# Patient Record
Sex: FEMALE | Race: WHITE | NOT HISPANIC OR LATINO | Employment: OTHER | URBAN - METROPOLITAN AREA
[De-identification: names, ages, dates, MRNs, and addresses within clinical notes are randomized per-mention and may not be internally consistent; named-entity substitution may affect disease eponyms.]

---

## 2020-05-19 PROBLEM — M85.88 OTHER SPECIFIED DISORDERS OF BONE DENSITY AND STRUCTURE, OTHER SITE: Status: ACTIVE | Noted: 2020-05-19

## 2020-12-15 PROBLEM — M81.0 SENILE OSTEOPOROSIS: Status: ACTIVE | Noted: 2020-12-15

## 2021-08-13 PROBLEM — R31.0 GROSS HEMATURIA: Status: ACTIVE | Noted: 2021-08-13

## 2021-08-13 PROBLEM — N28.89 RENAL MASS, RIGHT: Status: ACTIVE | Noted: 2021-08-13

## 2021-08-26 PROBLEM — R04.2 HEMOPTYSIS: Status: ACTIVE | Noted: 2021-08-26

## 2022-02-23 ENCOUNTER — CLINICAL SUPPORT (OUTPATIENT)
Dept: REHABILITATION | Facility: HOSPITAL | Age: 67
End: 2022-02-23
Payer: MEDICARE

## 2022-02-23 DIAGNOSIS — M25.60 RANGE OF MOTION DEFICIT: ICD-10-CM

## 2022-02-23 DIAGNOSIS — M67.442 GANGLION OF LEFT HAND: Primary | ICD-10-CM

## 2022-02-23 DIAGNOSIS — M67.442 GANGLION OF LEFT HAND: ICD-10-CM

## 2022-02-23 PROCEDURE — 97165 OT EVAL LOW COMPLEX 30 MIN: CPT | Mod: PN

## 2022-02-23 PROCEDURE — 97110 THERAPEUTIC EXERCISES: CPT | Mod: PN

## 2022-02-23 NOTE — PATIENT INSTRUCTIONS
"[facility] - OCCUPATIONAL THERAPY  HOME EXERCISE PROGRAM     Moist heat x 10 minutes.    Complete the following massages once incision site is healed for 2 minutes each, 2 x/day.                       Complete the following exercises with 10 repetitions each, 4x/day.     AROM: Isolated MCP Flexion / Extension ("Wave")   Bend only your large, bottom knuckles. Hold 3 seconds. Keep the tips of your fingers straight. Straighten fingers.    AROM: Isolated IPJ Flexion / Extension ("Hook")  Bend only your middle and end knuckles. Hold 3 seconds.   Straighten your fingers.     AROM: MCP and PIP Flexion / Extension ("Straight Fist")  Bend your bottom and middle knuckles, keeping the tips of your fingers straight. Try to touch the pads of your fingers on your palm. Hold 3 seconds. Straighten your fingers.     AROM: Composite Flexion / Extension ("Full Fist")  Bend every joint in your hand into a fist. Hold 3 seconds. Straighten your fingers.     AROM: Composte Extension ("Finger Lifts")  Lift your finger off of the table one at a time. Hold 3 seconds. Relax your finger.    AROM: Abduction / Adduction  With hand flat on table, spread all fingers apart, then bring them together as close as possible.    Copyright © I. All rights reserved.      AROM: Wrist Flexion / Extension               Bend your wrist forward and back as far as possible.      AROM: Wrist Radial / Ulnar Deviation  Bend your wrist from side to side as far as possible.    AROM: Wrist Flexion / Extension  Make a fist, then bend your wrist forward then back as far as possible.         AROM: Wrist Radial / Ulnar Deviation   Make a fist then bend your wrist toward your body, then away.         Copyright © I. All rights reserved.     Therapist: JENNA Marx, HARI       "

## 2022-02-23 NOTE — PLAN OF CARE
"  [facility] Occupational Therapy  Initial Evaluation     Name: Prince Crowder  Clinic Number: [id number]    Therapy Diagnosis:   Encounter Diagnoses   Name Primary?    Ganglion of left hand     Range of motion deficit      Physician: Soy Edmond    Physician Orders: Eval and treat A/AA/PROM  Medical Diagnosis: Left hand ganglion cyst  Surgical Procedure and Date: 2/17/2022   Evaluation Date: 2/23/2022  Insurance: Medicare  Insurance Authorization period Expiration: 03/08/2022     Plan of Care Certification Period: 03/08/2022    Visit # / Visits Authortized: 1 / 1  Time In:4:00 pm  Time Out: 4:50 pm  Total Billable Time: 10 minutes    Precautions: Standard    Subjective     Involved Side: left  Dominant Side: Right  Date of Onset: 11/2021 to 12/2-21  Mechanism of Injury: Pt reports that she thinks the cyst came from receiving an IV  History of Current Condition: Pt states that she began having dorsal shooting pains from the cyst.  Imaging: none available  Previous Therapy: yes    Patient's Goals for Therapy: "regain full use of left hand"    Pain:  Functional Pain Scale Rating 0-10:   5/10 on average  4/10 at best  8/10 at worst  Location: left wrist  Description: Aching and Dull  Aggravating Factors: Flexing  Easing Factors: rest    Occupation:  retired  Working presently: home-maker  Duties: household duties    Functional Limitations/Social History:    Previous functional status includes: Independent with all ADLs.     Current FunctionalStatus   Home/Living environment : lives with their spouse      Limitation of Functional Status as follows:   ADLs/IADLs:     - Feeding:Independent    - Bathing: Modified independent    - Dressing/Grooming: Difficulty with buttoning    - Driving: Independent     Leisure: Crafts - [place] - Modified independent      Past Medical History/Physical Systems Review:   Prince Crowder  has a past medical history of Allergic rhinitis, seasonal, Arthritis, JASSI " (generalized anxiety disorder), GERD (gastroesophageal reflux disease), Hiatal hernia, History of removal of cyst, Hypoglycemia, IBS (irritable bowel syndrome), Migraine, Mitral valve prolapse, Osteoporosis, and Postsurgical dumping syndrome.    Prince Crowder  has a past surgical history that includes Cyst Removal; Hysterectomy; Appendectomy;  section; arm surgery; Dilation and curettage of uterus (); Cholecystectomy; Bronchoscopy (N/A, 2021); and Colonoscopy (2020).    Prince has a current medication list which includes the following prescription(s): amitriptyline, aspirin, calcium carbonate-vit d3-min, denosumab, dicyclomine, domperidone, fluoxetine, ibuprofen, meloxicam, mesalamine, pantoprazole, premarin, promethazine, and sucralfate.    Review of patient's allergies indicates:   Allergen Reactions    Cinobac [cinoxacin] Hives    Morphine Hives    Iodinated contrast media           Objective   Observation/Appearance:   Sutures intact and no signs or symptoms of infection noted.    Edema. Measured in centimeters.   2022    Left Right   Proximal Wrist Crease 15.6 14.8   Mid palm 17.2 17.8   MCPs 17 18       Wrist AROM  Date 2022    Left Right   Supination/Pronation     Wrist ext/flex 55/20 65/65   Wrist RD/UD 20/35 20/35       Hand ROM. WNL    Sensation:  Intact       Strength (Dyanmometer) and Pinch Strength (Pinch Gauge)  Measured in pounds and psi. Average of three trials.   2022    Left Right   Rung II def def           CMS Impairment/Limitation/Restriction for FOTO Hand Survey    Therapist reviewed FOTO scores for Prince Crowder on 2022.   FOTO documents entered into [employer] - see Media section.    Limitation Score: 52%  Category: Carrying    Current : CK = at least 40% but < 60% impaired, limited or restricted  Goal: CJ = at least 20% but < 40% impaired, limited or restricted           Treatment     Treatment Time In: 4:00 pm  Treatment Time  "Out: 4:50 pm  Total Treatment time separate from Evaluation time:10 minutes    Prince received the following supervised modalities after being cleared for contradictions for 10 minutes:   -Patient received MH x 10 min to left wrist to increase blood flow, circulation and tissue elasticity prior to therex      Prince received the following direct contact modalities after being cleared for contraindications for 0 minutes:  -NT    Prince received the following manual therapy techniques for 0 minutes:   -NT    Prince received therapeutic exercises for 10 minutes including:  -AROM as follows: TGEs, finger abd/add, finger ext, wrist ext/flex (open/closed hand), RD/UD (open/closed hand) x 10 reps each    Home Exercise Program/Education:  Issued HEP (see patient instructions in EMR) and educated on modality use for pain management . Exercises were reviewed and Prinec was able to demonstrate them prior to the end of the session.   Pt received a written copy of exercises to perform at home. Prince demonstrated good  understanding of the education provided.  Pt was advised to perform these exercises free of pain, and to stop performing them if pain occurs.    Patient/Family Education: role of OT, goals for OT, scheduling/cancellations - pt verbalized understanding. Discussed insurance limitations with patient.    Additional Education provided: Issued Tubigrip C for edema management  -cleaned incision site with soap and water with 1" guaze over incision and Tubigrip    Assessment     Prince Crowder is a 66 y.o. female referred to outpatient occupational/hand therapy and presents with a medical diagnosis of left hand ganglion cyst, resulting in edema, decreased range of motion, decreased strength and demonstrates limitations as described in the chart below. Following a brief medical record review it is determined that pt will benefit from occupational therapy services in order to maximize pain free and/or functional use of left hand/wrist.     The " "patient's rehab potential is Excellent.     Anticipated barriers to occupational therapy: none  Pt has no cultural, educational or language barriers to learning provided.    Profile and History Assessment of Occupational Performance Level of Clinical Decision Making Complexity Score   Occupational Profile:   Prince Crowder is a 66 y.o. female who lives with their spouse and is currently home-maker. Prince Crowder has difficulty with  bathing and dressing  housework/household chores  affecting his/her daily functional abilities. His/her main goal for therapy is "regain full use of left hand".     Comorbidities:    has a past medical history of Allergic rhinitis, seasonal, Arthritis, JASSI (generalized anxiety disorder), GERD (gastroesophageal reflux disease), Hiatal hernia, History of removal of cyst, Hypoglycemia, IBS (irritable bowel syndrome), Migraine, Mitral valve prolapse, Osteoporosis, and Postsurgical dumping syndrome.        Medical and Therapy History Review:   Brief               Performance Deficits    Physical:  Joint Mobility  Muscle Power/Strength  Muscle Endurance  Skin Integrity/Scar Formation  Edema   Strength  Pain    Cognitive:  No Deficits    Psychosocial:    No Deficits     Clinical Decision Making:  low    Assessment Process:  Problem-Focused Assessments    Modification/Need for Assistance:  Not Necessary    Intervention Selection:  Limited Treatment Options       low  Based on PMHX, co morbidities , data from assessments and functional level of assistance required with task and clinical presentation directly impacting function.       The following goals were discussed with the patient and patient is in agreement with them as to be addressed in the treatment plan.     Goals:   Short Term (4 weeks on 3/23/2022):  1)   Patient to be IND with HEP and modalities for pain management.  2)   Increase ROM 10 to 15 degrees for wrist flexion  to increase functional hand use for buttoning.  3)   Measure  " strength at 4 weeks post op.  4)   Decrease edema .2-.3 cm to increase joint mobility /flexibility for improved overall functional hand use.   5)   Decrease complaints of pain to  2 out of 10 to increase functional hand use for ADL/work/leisure activities.    Long Term (by discharge):  1)   Pt will report 0 out of 10 pain with left hand use.  2)   Patient to score at  on FOTO to demonstrate improved perception of functional left hand use.  3)   Pt will return to prior level of function for ADLs and household management.       Plan   Certification Period/Plan of care expiration: 2/23/2022 to 4/7/2022.    Outpatient Occupational Therapy 2 times weekly for 6 weeks may include the following interventions: Paraffin, Fluidotherapy, Manual therapy/joint mobilizations, Modalities for pain management, US 3 mhz, Therapeutic exercises/activities., Strengthening, Edema Control, Scar Management and Wound Care.      Kamilla Lackey, OT    I certify the need for these services furnished under the plan of treatment and while under my care.     ____________________________________________________________________  Physician/Referring Practitioner   Date of Signature

## 2022-02-28 ENCOUNTER — CLINICAL SUPPORT (OUTPATIENT)
Dept: REHABILITATION | Facility: HOSPITAL | Age: 67
End: 2022-02-28
Payer: MEDICARE

## 2022-02-28 DIAGNOSIS — M25.60 RANGE OF MOTION DEFICIT: Primary | ICD-10-CM

## 2022-02-28 PROCEDURE — 97110 THERAPEUTIC EXERCISES: CPT | Mod: PN

## 2022-02-28 NOTE — PROGRESS NOTES
Occupational Therapy Daily Treatment Note     Date: 2/28/2022  Name: Prince Crowder  Clinic Number: [id number]    Therapy Diagnosis:   Encounter Diagnosis   Name Primary?    Range of motion deficit Yes     Physician: Soy Edmond    Physician Orders: Eval and treat A/AA/PROM  Medical Diagnosis: Left hand ganglion cyst  Surgical Procedure and Date: 2/17/2022   Evaluation Date: 2/23/2022  Insurance: Medicare  Insurance Authorization period Expiration: 03/08/2022      Plan of Care Certification Period: 03/08/2022     Visit # / Visits Authortized: 2/20  Time In:1:30 pm  Time Out: 2:05 pm  Total Billable Time: 25 minutes     Precautions: Standard    Subjective     Pt reports: she was compliant with home exercise program given last session.   Response to previous treatment:more motion and soreness  Functional change: none noted    Pain: 6/10  Location: left wrist     Objective   Edema. Measured in centimeters.    2/23/2022 2/23/2022     Left Right   Proximal Wrist Crease 15.6 14.8   Mid palm 17.2 17.8   MCPs 17 18         Wrist AROM  Date 2/23/2022 2/23/2022     Left Right   Supination/Pronation       Wrist ext/flex 55/20 65/65   Wrist RD/UD 20/35 20/35          Strength (Dyanmometer) and Pinch Strength (Pinch Gauge)  Measured in pounds and psi. Average of three trials.    2/23/2022 2/23/2022     Left Right   Rung II def def          Prince received the following supervised modalities after being cleared for contradictions for 10 minutes:   -Patient received MH x 10 min to left hand/wrist to increase blood flow, circulation and tissue elasticity prior to therex      Prince received the following manual therapy techniques for 0 minutes:   -NT    Prince received therapeutic exercises for 25 minutes including:  -AROM as follows: TGEs, finger abd/add, finger ext, wrist ext/flex (open/closed hand), RD/UD (open/closed hand) x 10 reps each   -blue wrist roller for wrist flex/ext x 3'  -dowel for  RD/UD x 3'     Pt washed incision site. Guaze and Tubigrip C re-applied.  Home Exercises and Education Provided     Education provided:   - cont HEP  - Progress towards goals: Excellent     Written Home Exercises Provided: Patient instructed to cont prior HEP.  Exercises were reviewed and Prince was able to demonstrate them prior to the end of the session.  Prince demonstrated good  understanding of the education provided.     See EMR under Patient Instructions for exercises provided prior visit.     Prince demonstrated good  understanding of the education provided.         Assessment      Prince Crowder is a 66 y.o. female referred to outpatient occupational/hand therapy and presents with a medical diagnosis of left hand ganglion cyst, resulting in edema, decreased range of motion and decreased strength of left hand.    Pt would continue to benefit from skilled OT.      Prince is progressing well towards her goals and there are no updates to goals at this time. Pt prognosis is Excellent.     Pt will continue to benefit from skilled outpatient occupational therapy to address the deficits listed in the problem list on initial evaluation provide pt/family education and to maximize pt's level of independence in the home and community environment.     Anticipated barriers to occupational therapy: none    Pt's spiritual, cultural and educational needs considered and pt agreeable to plan of care and goals.    Goals:  Short Term (4 weeks on 3/23/2022):  1)   Patient to be IND with HEP and modalities for pain management.-Met 2/28/2022  2)   Increase ROM 10 to 15 degrees for wrist flexion  to increase functional hand use for buttoning.-progressing  3)   Measure  strength at 4 weeks post op.-progressing  4)   Decrease edema .2-.3 cm to increase joint mobility /flexibility for improved overall functional hand use. -progressing  5)   Decrease complaints of pain to  2 out of 10 to increase functional hand use for ADL/work/leisure  activities.-progressing     Long Term (by discharge):  1)   Pt will report 0 out of 10 pain with left hand use-progressing  2)   Patient to score at  on FOTO to demonstrate improved perception of functional left hand use.-progressing  3)   Pt will return to prior level of function for ADLs and household management. -progressing        Plan   Certification Period/Plan of care expiration: 2/23/2022 to 4/7/2022.     Outpatient Occupational Therapy 2 times weekly for 6 weeks may include the following interventions: Paraffin, Fluidotherapy, Manual therapy/joint mobilizations, Modalities for pain management, US 3 mhz, Therapeutic exercises/activities., Strengthening, Edema Control, Scar Management and Wound Care.       Discussed Plan of Care with patient: Yes  Updates/Grading for next session: Advance as tolerated.      Kamilla Lackey, OT

## 2022-03-04 ENCOUNTER — CLINICAL SUPPORT (OUTPATIENT)
Dept: REHABILITATION | Facility: HOSPITAL | Age: 67
End: 2022-03-04
Payer: MEDICARE

## 2022-03-04 DIAGNOSIS — M25.60 RANGE OF MOTION DEFICIT: Primary | ICD-10-CM

## 2022-03-04 PROCEDURE — 97110 THERAPEUTIC EXERCISES: CPT | Mod: PN

## 2022-03-04 PROCEDURE — 97022 WHIRLPOOL THERAPY: CPT | Mod: PN

## 2022-03-04 NOTE — PROGRESS NOTES
Occupational Therapy Daily Treatment Note     Date: 3/4/2022  Name: Prince Crowder  Clinic Number: [id number]    Therapy Diagnosis:   Encounter Diagnosis   Name Primary?    Range of motion deficit Yes     Physician: Soy Edmond    Physician Orders: Eval and treat A/AA/PROM  Medical Diagnosis: Left hand ganglion cyst  Surgical Procedure and Date: 2/17/2022   Evaluation Date: 2/23/2022  Insurance: Medicare  Insurance Authorization period Expiration: 03/08/2022      Plan of Care Certification Period: 03/08/2022     Visit # / Visits Authortized: 3/20  Time In: 12:00  pm  Time Out: 12:35  pm  Total Billable Time: 25 minutes     Precautions: Standard    Subjective     Pt reports: she was compliant with home exercise program given last session.   Response to previous treatment:more motion and soreness  Functional change: none noted    Pain: 4/10  Location: left wrist     Objective   Observations:  Sutures removed. Scar healing well.    Edema. Measured in centimeters.    2/23/2022 2/23/2022     Left Right   Proximal Wrist Crease 15.6 14.8   Mid palm 17.2 17.8   MCPs 17 18         Wrist AROM  Date 2/23/2022 2/23/2022     Left Right   Supination/Pronation       Wrist ext/flex 55/20 65/65   Wrist RD/UD 20/35 20/35          Strength (Dyanmometer) and Pinch Strength (Pinch Gauge)  Measured in pounds and psi. Average of three trials.    2/23/2022 2/23/2022     Left Right   Rung II def def          Prince received the following supervised modalities after being cleared for contradictions for 10 minutes:   -Patient received fluidotherapy to left hand for 10 minutes to increase blood flow, circulation, desensitization, sensory re-education and for pain management.     Prince received the following manual therapy techniques for 0 minutes:   -NT    Prince received therapeutic exercises for 25 minutes including:  -AROM as follows: TGEs, finger abd/add, finger ext, wrist ext/flex (open/closed hand), RD/UD  (open/closed hand) x 10 reps each   -blue wrist roller for wrist flex/ext x 3'  -dowel for RD/UD x 3'   -hand gripper 1 yellow band x 2'    Tubigrip C re-applied.  Home Exercises and Education Provided     Education provided:   - cont HEP  - Progress towards goals: Excellent     Written Home Exercises Provided: Patient instructed to cont prior HEP.  Exercises were reviewed and Prince was able to demonstrate them prior to the end of the session.  Prince demonstrated good  understanding of the education provided.     See EMR under Patient Instructions for exercises provided prior visit.     Prince demonstrated good  understanding of the education provided.         Assessment      Prince Crowder is a 66 y.o. female referred to outpatient occupational/hand therapy and presents with a medical diagnosis of left hand ganglion cyst, resulting in edema, decreased range of motion and decreased strength of left hand. Pt's sutures were removed and her scar is healing well. Pt tolerated all activities without difficulty. Pt is motivated and participates well.     Pt would continue to benefit from skilled OT.      Prince is progressing well towards her goals and there are no updates to goals at this time. Pt prognosis is Excellent.     Pt will continue to benefit from skilled outpatient occupational therapy to address the deficits listed in the problem list on initial evaluation provide pt/family education and to maximize pt's level of independence in the home and community environment.     Anticipated barriers to occupational therapy: none    Pt's spiritual, cultural and educational needs considered and pt agreeable to plan of care and goals.    Goals:  Short Term (4 weeks on 3/23/2022):  1)   Patient to be IND with HEP and modalities for pain management.-Met 2/28/2022  2)   Increase ROM 10 to 15 degrees for wrist flexion  to increase functional hand use for buttoning.-progressing  3)   Measure  strength at 4 weeks post op.-progressing  4)    Decrease edema .2-.3 cm to increase joint mobility /flexibility for improved overall functional hand use. -progressing  5)   Decrease complaints of pain to  2 out of 10 to increase functional hand use for ADL/work/leisure activities.-progressing     Long Term (by discharge):  1)   Pt will report 0 out of 10 pain with left hand use-progressing  2)   Patient to score at  on FOTO to demonstrate improved perception of functional left hand use.-progressing  3)   Pt will return to prior level of function for ADLs and household management. -progressing        Plan: Measure next session.   Certification Period/Plan of care expiration: 2/23/2022 to 4/7/2022.     Outpatient Occupational Therapy 2 times weekly for 6 weeks may include the following interventions: Paraffin, Fluidotherapy, Manual therapy/joint mobilizations, Modalities for pain management, US 3 mhz, Therapeutic exercises/activities., Strengthening, Edema Control, Scar Management and Wound Care.       Discussed Plan of Care with patient: Yes  Updates/Grading for next session: Advance as tolerated.      Kamilla Lackey, OT

## 2022-03-07 ENCOUNTER — CLINICAL SUPPORT (OUTPATIENT)
Dept: REHABILITATION | Facility: HOSPITAL | Age: 67
End: 2022-03-07
Payer: MEDICARE

## 2022-03-07 DIAGNOSIS — M25.60 RANGE OF MOTION DEFICIT: Primary | ICD-10-CM

## 2022-03-07 PROCEDURE — 97022 WHIRLPOOL THERAPY: CPT | Mod: PN

## 2022-03-07 PROCEDURE — 97110 THERAPEUTIC EXERCISES: CPT | Mod: PN

## 2022-03-07 NOTE — PROGRESS NOTES
Occupational Therapy Daily Treatment Note     Date: 3/7/2022  Name: Prince Crowder  Clinic Number: [id number]    Therapy Diagnosis:   Encounter Diagnosis   Name Primary?    Range of motion deficit Yes     Physician: Soy Edmond    Physician Orders: Eval and treat A/AA/PROM  Medical Diagnosis: Left hand ganglion cyst  Surgical Procedure and Date: 2/17/2022   Evaluation Date: 2/23/2022  Insurance: Medicare  Insurance Authorization period Expiration: 03/08/2022      Plan of Care Certification Period: 03/08/2022     Visit # / Visits Authortized: 4/20  Time In: 2:15  pm  Time Out: 3:00 pm  Total Billable Time: 35 minutes     Precautions: Standard    Subjective     Pt reports: she was compliant with home exercise program given last session.   Response to previous treatment:more motion and soreness  Functional change: Pt can  light objects without pain.    Pain: 2/10 at best 6/10 at worst  Location: left wrist     Objective   Observations:  Scar healing well.    Edema. Measured in centimeters.    2/23/2022 2/23/2022 3/7/2022     Left Right Left   Proximal Wrist Crease 15.6 14.8 15   Mid palm 17.2 17.8 16   MCPs 17 18 17         Wrist AROM  Date 2/23/2022 2/23/2022 3/7/2022     Left Right Left   Supination/Pronation  WNL WNL  WNL   Wrist ext/flex 55/20 65/65 65/50   Wrist RD/UD 20/35 20/35 20/35          Strength (Dyanmometer) and Pinch Strength (Pinch Gauge)  Measured in pounds and psi. Average of three trials.    2/23/2022 2/23/2022     Left Right   Rung II def def          Prince received the following supervised modalities after being cleared for contradictions for 10 minutes:   -Patient received fluidotherapy to left hand for 10 minutes to increase blood flow, circulation, desensitization, sensory re-education and for pain management.     Prince received the following manual therapy techniques for 2 minutes:   -scar massage    Prince received therapeutic exercises for 33 minutes  including:  -AROM as follows: TGEs, finger abd/add, finger ext, wrist ext/flex (open/closed hand), RD/UD (open/closed hand) x 10 reps each   -blue wrist roller for wrist flex/ext x 3'  -wrist 3 ways with 1 lb x 2' each  -hand gripper 1 red band x 2'  -digi extend with 1 yellow band x 2'    Home Exercises and Education Provided     Education provided:   - cont HEP  - Progress towards goals: Excellent     Written Home Exercises Provided: Patient instructed to cont prior HEP.  Exercises were reviewed and Prince was able to demonstrate them prior to the end of the session.  Prince demonstrated good  understanding of the education provided.     See EMR under Patient Instructions for exercises provided prior visit.     Prince demonstrated good  understanding of the education provided.         Assessment      Prince Crowder is a 66 y.o. female referred to outpatient occupational/hand therapy and presents with a medical diagnosis of left hand ganglion cyst, resulting in edema, decreased range of motion and decreased strength of left hand. Pt's edema has decreased in her left hand. She has made moderate improvements with her AROM in her left wrist. Advanced activities and she tolerated all activities mild c/o pain. Pt is motivated and participates well.     Pt would continue to benefit from skilled OT.      Prince is progressing well towards her goals and there are no updates to goals at this time. Pt prognosis is Excellent.     Pt will continue to benefit from skilled outpatient occupational therapy to address the deficits listed in the problem list on initial evaluation provide pt/family education and to maximize pt's level of independence in the home and community environment.     Anticipated barriers to occupational therapy: none    Pt's spiritual, cultural and educational needs considered and pt agreeable to plan of care and goals.    Goals:  Short Term (4 weeks on 3/23/2022):  1)   Patient to be IND with HEP and modalities for pain  management.-Met 2/28/2022  2)   Increase ROM 10 to 15 degrees for wrist flexion  to increase functional hand use for buttoning.- met 3/7/2022  3)   Measure  strength at 4 weeks post op.-progressing  4)   Decrease edema .2-.3 cm to increase joint mobility /flexibility for improved overall functional hand use. - met 3/7/2022  5)   Decrease complaints of pain to  2 out of 10 to increase functional hand use for ADL/work/leisure activities.-progressing     Long Term (by discharge):  1)   Pt will report 0 out of 10 pain with left hand use-progressing  2)   Patient to score at  on FOTO to demonstrate improved perception of functional left hand use.-progressing  3)   Pt will return to prior level of function for ADLs and household management. -progressing        Plan:    Certification Period/Plan of care expiration: 2/23/2022 to 4/7/2022.     Outpatient Occupational Therapy 2 times weekly for 6 weeks may include the following interventions: Paraffin, Fluidotherapy, Manual therapy/joint mobilizations, Modalities for pain management, US 3 mhz, Therapeutic exercises/activities., Strengthening, Edema Control, Scar Management and Wound Care.       Discussed Plan of Care with patient: Yes  Updates/Grading for next session: Advance as tolerated.      Kamilla Lackey, OT

## 2022-03-21 ENCOUNTER — CLINICAL SUPPORT (OUTPATIENT)
Dept: REHABILITATION | Facility: HOSPITAL | Age: 67
End: 2022-03-21
Payer: MEDICARE

## 2022-03-21 DIAGNOSIS — M25.60 RANGE OF MOTION DEFICIT: Primary | ICD-10-CM

## 2022-03-21 PROCEDURE — 97110 THERAPEUTIC EXERCISES: CPT | Mod: PN

## 2022-03-21 PROCEDURE — 97022 WHIRLPOOL THERAPY: CPT | Mod: PN

## 2022-03-21 NOTE — PROGRESS NOTES
Occupational Therapy Daily Treatment Note     Date: 3/21/2022  Name: Prince Crowder  Clinic Number: [id number]    Therapy Diagnosis:   Encounter Diagnosis   Name Primary?    Range of motion deficit Yes     Physician: Soy Edmond    Physician Orders: Eval and treat A/AA/PROM  Medical Diagnosis: Left hand ganglion cyst  Surgical Procedure and Date: 2/17/2022   Evaluation Date: 2/23/2022  Insurance: Medicare  Insurance Authorization period Expiration: 03/08/2022      Plan of Care Certification Period: 03/08/2022     Visit # / Visits Authortized: 5/20  Time In: 3:05  pm  Time Out:3:55 pm  Total Billable Time: 40 minutes     Precautions: Standard    Subjective     Pt reports: she was not compliant with home exercise program given last session due to illness. She states that she had to cancel last week because she was sick.  Response to previous treatment:more motion and soreness  Functional change: less pain with left hand use    Pain: 1/10 at best 5/10 at worst  Location: left wrist     Objective   Observations:  Scar healing well.    Edema. Measured in centimeters.    2/23/2022 2/23/2022 3/7/2022     Left Right Left   Proximal Wrist Crease 15.6 14.8 15   Mid palm 17.2 17.8 16   MCPs 17 18 17         Wrist AROM  Date 2/23/2022 2/23/2022 3/7/2022     Left Right Left   Supination/Pronation  WNL WNL  WNL   Wrist ext/flex 55/20 65/65 65/50   Wrist RD/UD 20/35 20/35 20/35          Strength (Dyanmometer) and Pinch Strength (Pinch Gauge)  Measured in pounds and psi. Average of three trials.    2/23/2022 2/23/2022     Left Right   Rung II def def         CMS Impairment/Limitation/Restriction for FOTO Hand Survey    Therapist reviewed FOTO scores for Prince Crowder on 3/21/2022.   FOTO documents entered into [employer] - see Media section.    Limitation Score: 40%  Category: Carrying    Current : CK = at least 40% but < 60% impaired, limited or restricted  Goal: CJ = at least 20% but < 40% impaired,  limited or restricted              Prince received the following supervised modalities after being cleared for contradictions for 10 minutes:   -Patient received fluidotherapy to left hand for 10 minutes to increase blood flow, circulation, desensitization, sensory re-education and for pain management.     Prince received the following manual therapy techniques for 2 minutes:   -scar massage    Prince received therapeutic exercises for 38 minutes including:  -AROM as follows: TGEs, finger abd/add, finger ext, wrist ext/flex (open/closed hand), RD/UD (open/closed hand) x 10 reps each   -blue wrist roller for wrist flex/ext x 3'  -dextercisor x 3'  -wrist 3 ways with 1 lb x 2' each  -hand gripper 1 red band x 2'  -digi extend with 1 yellow band x 2'    Home Exercises and Education Provided     Education provided:   - cont HEP, Issued silicone tape for scar management  - Progress towards goals: Excellent     Written Home Exercises Provided: Patient instructed to cont prior HEP.  Exercises were reviewed and Prince was able to demonstrate them prior to the end of the session.  Prince demonstrated good  understanding of the education provided.     See EMR under Patient Instructions for exercises provided prior visit.     Prince demonstrated good  understanding of the education provided.         Assessment      Prince Crowder is a 66 y.o. female referred to outpatient occupational/hand therapy and presents with a medical diagnosis of left hand ganglion cyst, resulting in edema, decreased range of motion and decreased strength of left hand. Pt reports decreased pain with left hand use. She states that she wasn't as compliant with her HEP due to illness.  Advanced activities without c/o pain. Pt is motivated and participates well.     Pt would continue to benefit from skilled OT.      Prince is progressing well towards her goals and there are no updates to goals at this time. Pt prognosis is Excellent.     Pt will continue to benefit from skilled  outpatient occupational therapy to address the deficits listed in the problem list on initial evaluation provide pt/family education and to maximize pt's level of independence in the home and community environment.     Anticipated barriers to occupational therapy: none    Pt's spiritual, cultural and educational needs considered and pt agreeable to plan of care and goals.    Goals:  Short Term (4 weeks on 3/23/2022):  1)   Patient to be IND with HEP and modalities for pain management.-Met 2/28/2022  2)   Increase ROM 10 to 15 degrees for wrist flexion  to increase functional hand use for buttoning.- met 3/7/2022  3)   Measure  strength at 4 weeks post op.-progressing  4)   Decrease edema .2-.3 cm to increase joint mobility /flexibility for improved overall functional hand use. - met 3/7/2022  5)   Decrease complaints of pain to  2 out of 10 to increase functional hand use for ADL/work/leisure activities.-progressing     Long Term (by discharge):  1)   Pt will report 0 out of 10 pain with left hand use-progressing  2)   Patient to score at  on FOTO to demonstrate improved perception of functional left hand use.-progressing  3)   Pt will return to prior level of function for ADLs and household management. -progressing        Plan: Measure next session.   Certification Period/Plan of care expiration: 2/23/2022 to 4/7/2022.     Outpatient Occupational Therapy 2 times weekly for 6 weeks may include the following interventions: Paraffin, Fluidotherapy, Manual therapy/joint mobilizations, Modalities for pain management, US 3 mhz, Therapeutic exercises/activities., Strengthening, Edema Control, Scar Management and Wound Care.       Discussed Plan of Care with patient: Yes  Updates/Grading for next session: Advance as tolerated.      Kamilla Lackey, OT

## 2022-03-24 ENCOUNTER — CLINICAL SUPPORT (OUTPATIENT)
Dept: REHABILITATION | Facility: HOSPITAL | Age: 67
End: 2022-03-24
Payer: MEDICARE

## 2022-03-24 DIAGNOSIS — M25.60 RANGE OF MOTION DEFICIT: Primary | ICD-10-CM

## 2022-03-24 PROCEDURE — 97110 THERAPEUTIC EXERCISES: CPT | Mod: PN

## 2022-03-24 PROCEDURE — 97022 WHIRLPOOL THERAPY: CPT | Mod: PN

## 2022-03-24 NOTE — PATIENT INSTRUCTIONS
[facility]  OCCUPATIONAL THERAPY  HOME EXERCISE PROGRAM     Complete the following strengthening program 1x/day.                       _          JENNA Marx, HARI  Certified Hand Therapist  Occupational Therapist                                          Arthritis Conservative Management  Joint Protection:  Use larger joints and muscles when possible  Stop activities before the point of discomfort  Decrease activities that cause pain that lasts more than 2 hours  Avoid activities that put strain on painful or stiff joints  Avoid a tight or prolonged grasp on objects  Balance Rest and Activity:  Rest before exhaustion  Take frequent, short breaks  Avoid activities that cannot be stopped  Avoid staying in one position for a long time  Alternate heavy and light activities  Take more breaks when inflammation is active  Allow extra time for activities--avoid rushing  Plan your day ahead of time  Eliminate unnecessary activities  Reduce the effort:  Avoid excessive loads. Dont be afraid to ask for help. Use appliances to make tasks easier.  Keep items near (i.e. keeping commonly used appliances on the counter)  Use prepared foods as needed.  Avoid low chairs  Maintain a healthy body weight  Freeze leftovers for an easy meal later  Sit while working when possible  Avoid positions of deformity:  Rheumatoid Arthritis (RA): avoid the use of the hands that push the fingers ulnarly, such as wringing a cloth or opening tight jars.   Osteoarthritis (OA): Avoid stress to the CMC joint, such as sustained pinching.      Helpful Tips: slide objects; use your palms instead of fingers, keeps heavy items close to your body, use built up handles, look for lightweight options, use wheels to roll objects, use scissors to open packages, use lever type handles, pizza cutters for cutting, electrical toothbrush, avoid small buttons or use Velcro, use pumps for soaps/shampoos, etc.

## 2022-03-24 NOTE — PROGRESS NOTES
Occupational Therapy Daily Treatment Note     Date: 3/24/2022  Name: Prince Crowder  Clinic Number: [id number]    Therapy Diagnosis:   Encounter Diagnosis   Name Primary?    Range of motion deficit Yes     Physician: Soy Edmond    Physician Orders: Eval and treat A/AA/PROM  Medical Diagnosis: Left hand ganglion cyst  Surgical Procedure and Date: 2/17/2022   Evaluation Date: 2/23/2022  Insurance: Medicare  Insurance Authorization period Expiration: 03/08/2022      Plan of Care Certification Period: 03/08/2022     Visit # / Visits Authortized: 6/20  Time In: 3:05  pm  Time Out:3:55 pm  Total Billable Time: 40 minutes     Precautions: Standard    Subjective     Pt reports: she was  compliant with home exercise program given last session.  Response to previous treatment:more motion and soreness  Functional change: bathing independently, folding clothes, loading the  without difficulty.  Pain: 0/10 at best 3-4/10 at worst  Location: left wrist     Objective       Edema. Measured in centimeters.    2/23/2022 2/23/2022 3/7/2022 3/24/2022     Left Right Left Left   Proximal Wrist Crease 15.6 14.8 15 15   Mid palm 17.2 17.8 16 16   MCPs 17 18 17 17         Wrist AROM  Date 2/23/2022 2/23/2022 3/7/2022 3/24/2022     Left Right Left Left   Supination/Pronation  WNL WNL  WNL WNL   Wrist ext/flex 55/20 65/65 65/50 70/65   Wrist RD/UD 20/35 20/35 20/35 20/35          Strength (Dyanmometer) and Pinch Strength (Pinch Gauge)  Measured in pounds and psi. Average of three trials.    2/23/2022 2/23/2022 3/24/2022 3/24/2022     Left Right Left Right   Rung II def def 29 45              Prince received the following supervised modalities after being cleared for contradictions for 10 minutes:   -Patient received fluidotherapy to left hand for 10 minutes to increase blood flow, circulation, desensitization, sensory re-education and for pain management.     Prince received the following manual  therapy techniques for 2 minutes:   -scar massage    Prince received therapeutic exercises for 38 minutes including:  -AROM as follows:  wrist ext/flex (open/closed hand), RD/UD (open/closed hand) x 10 reps each   -blue wrist roller for wrist flex/ext x 2'  -dextercisor x 2''  -wrist 3 ways with 2 lb x 2' each  -hand gripper 1 red band x 2'  -digi extend with 1 yellow band x 2'    Home Exercises and Education Provided     Education provided:   - cont HEP, red putty, joint protection  - Progress towards goals: Excellent     Written Home Exercises Provided: Patient instructed to cont prior HEP.  Exercises were reviewed and Prince was able to demonstrate them prior to the end of the session.  Prince demonstrated good  understanding of the education provided.     See EMR under Patient Instructions for exercises provided prior visit.     Prince demonstrated good  understanding of the education provided.         Assessment      Prince Crowder is a 66 y.o. female referred to outpatient occupational/hand therapy and presents with a medical diagnosis of left hand ganglion cyst, resulting in edema, decreased range of motion and decreased strength of left hand. Pt's AROM of her wrist has made moderated improvements and is WNL.   strength taken today and pt has mild weakness in her left hand. She reports that she is able to do light household activities without difficulty.   Advanced activities without c/o pain. Instructed pt on joint protection techniques and pt verbalized good understanding.  Pt is motivated and participates well.     Pt would continue to benefit from skilled OT.      Prince is progressing well towards her goals and there are no updates to goals at this time. Pt prognosis is Excellent.     Pt will continue to benefit from skilled outpatient occupational therapy to address the deficits listed in the problem list on initial evaluation provide pt/family education and to maximize pt's level of independence in the home and  community environment.     Anticipated barriers to occupational therapy: none    Pt's spiritual, cultural and educational needs considered and pt agreeable to plan of care and goals.    Goals:  Short Term (4 weeks on 3/23/2022):  1)   Patient to be IND with HEP and modalities for pain management.-Met 2/28/2022  2)   Increase ROM 10 to 15 degrees for wrist flexion  to increase functional hand use for buttoning.- met 3/7/2022  3)   Measure  strength at 4 weeks post op.- Met 3/24/2022  4)   Decrease edema .2-.3 cm to increase joint mobility /flexibility for improved overall functional hand use. - met 3/7/2022  5)   Decrease complaints of pain to  2 out of 10 to increase functional hand use for ADL/work/leisure activities.-progressing     Long Term (by discharge):  1)   Pt will report 0 out of 10 pain with left hand use-progressing  2)   Patient to score at CJ on FOTO to demonstrate improved perception of functional left hand use.-progressing  3)   Pt will return to prior level of function for ADLs and household management. -progressing        Plan: Measure  again next week.   Certification Period/Plan of care expiration: 2/23/2022 to 4/7/2022.     Outpatient Occupational Therapy 2 times weekly for 6 weeks may include the following interventions: Paraffin, Fluidotherapy, Manual therapy/joint mobilizations, Modalities for pain management, US 3 mhz, Therapeutic exercises/activities., Strengthening, Edema Control, Scar Management and Wound Care.       Discussed Plan of Care with patient: Yes  Updates/Grading for next session: Advance as tolerated.      Kamilla Lackey, OT

## 2022-03-29 ENCOUNTER — CLINICAL SUPPORT (OUTPATIENT)
Dept: REHABILITATION | Facility: HOSPITAL | Age: 67
End: 2022-03-29
Payer: MEDICARE

## 2022-03-29 DIAGNOSIS — M25.60 RANGE OF MOTION DEFICIT: Primary | ICD-10-CM

## 2022-03-29 PROCEDURE — 97022 WHIRLPOOL THERAPY: CPT | Mod: PN

## 2022-03-29 PROCEDURE — 97110 THERAPEUTIC EXERCISES: CPT | Mod: PN

## 2022-03-29 NOTE — PROGRESS NOTES
Occupational Therapy Daily Treatment/Discharge Note     Date: 3/29/2022  Name: Prince Crowder  Clinic Number: [id number]    Therapy Diagnosis:   Encounter Diagnosis   Name Primary?    Range of motion deficit Yes     Physician: Soy Edmond    Physician Orders: Eval and treat A/AA/PROM  Medical Diagnosis: Left hand ganglion cyst  Surgical Procedure and Date: 2/17/2022   Evaluation Date: 2/23/2022  Insurance: Medicare  Insurance Authorization period Expiration: 03/08/2022      Plan of Care Certification Period: 03/08/2022     Visit # / Visits Authortized: 7/20  Time In: 2:25  pm  Time Out: 3:10 pm  Total Billable Time: 35 minutes     Precautions: Standard    Subjective     Pt reports: she was  compliant with home exercise program given last session.  Response to previous treatment: more strength  Functional change: bathing independently, folding clothes, loading the  without difficulty.  Pain: 0/10   Location: left wrist     Objective       Edema. Measured in centimeters.    2/23/2022 2/23/2022 3/7/2022 3/24/2022     Left Right Left Left   Proximal Wrist Crease 15.6 14.8 15 15   Mid palm 17.2 17.8 16 16   MCPs 17 18 17 17         Wrist AROM  Date 2/23/2022 2/23/2022 3/7/2022 3/24/2022     Left Right Left Left   Supination/Pronation  WNL WNL  WNL WNL   Wrist ext/flex 55/20 65/65 65/50 70/65   Wrist RD/UD 20/35 20/35 20/35 20/35          Strength (Dyanmometer) and Pinch Strength (Pinch Gauge)  Measured in pounds and psi. Average of three trials.    2/23/2022 2/23/2022 3/24/2022 3/24/2022 3/29/2022     Left Right Left Right Left   Rung II def def 29 45 33           CMS Impairment/Limitation/Restriction for FOTO Hand Survey    Therapist reviewed FOTO scores for Prince Crowder on 3/29/2022.   FOTO documents entered into [employer] - see Media section.    Limitation Score: 35%  Category: Carrying    Current : CJ = at least 20% but < 40% impaired, limited or restricted  Goal: CJ = at  least 20% but < 40% impaired, limited or restricted  Discharge: CJ = at least 20% but < 40% impaired, limited or restricted              Prince received the following supervised modalities after being cleared for contradictions for 10 minutes:   -Patient received fluidotherapy to left hand for 10 minutes to increase blood flow, circulation, desensitization, sensory re-education and for pain management.     Prince received the following manual therapy techniques for 2 minutes:   -scar massage    Prince received therapeutic exercises for 33 minutes including:  -AROM as follows:  wrist ext/flex (open/closed hand), RD/UD (open/closed hand) x 10 reps each   -blue wrist roller for wrist flex/ext x 2  -wrist 3 ways with 2 lb x 2' each  -hand gripper 1 red and 1 green band x 2'  -digi extend with 1 yellow band x 2'    Home Exercises and Education Provided     Education provided:   - cont HEP  - Progress towards goals: Excellent     Written Home Exercises Provided: Patient instructed to cont prior HEP.  Exercises were reviewed and Pirnce was able to demonstrate them prior to the end of the session.  Prince demonstrated good  understanding of the education provided.     See EMR under Patient Instructions for exercises provided prior visit.     Prince demonstrated good  understanding of the education provided.         Assessment      Prince Crowder is a 66 y.o. female referred to outpatient occupational/hand therapy and presents with a medical diagnosis of left hand ganglion cyst, resulting in edema, decreased range of motion and decreased strength of left hand. Pt's AROM of her wrist has made moderated improvements and is WNL.   strength taken today and pt has increased her  strength by 4 pounds since her last visit.  She reports that she is able to do light household activities without difficulty.   Advanced activities without c/o pain. Pt is motivated and participates well.     Prince is progressing well towards her goals and there are no  updates to goals at this time. Pt prognosis is Excellent.     Anticipated barriers to occupational therapy: none    Pt's spiritual, cultural and educational needs considered and pt agreeable to plan of care and goals.    Goals:  Short Term (4 weeks on 3/23/2022):  1)   Patient to be IND with HEP and modalities for pain management.-Met 2/28/2022  2)   Increase ROM 10 to 15 degrees for wrist flexion  to increase functional hand use for buttoning.- met 3/7/2022  3)   Measure  strength at 4 weeks post op.- Met 3/24/2022  4)   Decrease edema .2-.3 cm to increase joint mobility /flexibility for improved overall functional hand use. - met 3/7/2022  5)   Decrease complaints of pain to  2 out of 10 to increase functional hand use for ADL/work/leisure activities.- Met 3/29/2022     Long Term (by discharge):  1)   Pt will report 0 out of 10 pain with left hand use- Met 3/29/2022  2)   Patient to score at  on FOTO to demonstrate improved perception of functional left hand use.- Met 3/29/2022  3)   Pt will return to prior level of function for ADLs and household management. - Met 3/29/2022      Plan: Consult with physician. Recommend d/c to HEP.   Certification Period/Plan of care expiration: 2/23/2022 to 4/7/2022.     Outpatient Occupational Therapy 2 times weekly for 6 weeks may include the following interventions: Paraffin, Fluidotherapy, Manual therapy/joint mobilizations, Modalities for pain management, US 3 mhz, Therapeutic exercises/activities., Strengthening, Edema Control, Scar Management and Wound Care.       Discussed Plan of Care with patient: Yes  Updates/Grading for next session: Advance as tolerated.      Kamilla Lackey, OT

## 2023-02-15 PROBLEM — I70.0 AORTIC ATHEROSCLEROSIS: Status: ACTIVE | Noted: 2023-02-15
